# Patient Record
Sex: FEMALE | Race: ASIAN | NOT HISPANIC OR LATINO
[De-identification: names, ages, dates, MRNs, and addresses within clinical notes are randomized per-mention and may not be internally consistent; named-entity substitution may affect disease eponyms.]

---

## 2024-03-21 ENCOUNTER — APPOINTMENT (OUTPATIENT)
Dept: SURGERY | Facility: CLINIC | Age: 29
End: 2024-03-21
Payer: MEDICAID

## 2024-03-21 VITALS
WEIGHT: 135 LBS | DIASTOLIC BLOOD PRESSURE: 98 MMHG | SYSTOLIC BLOOD PRESSURE: 147 MMHG | HEART RATE: 74 BPM | BODY MASS INDEX: 26.5 KG/M2 | HEIGHT: 60 IN

## 2024-03-21 DIAGNOSIS — K59.00 CONSTIPATION, UNSPECIFIED: ICD-10-CM

## 2024-03-21 DIAGNOSIS — Z87.891 PERSONAL HISTORY OF NICOTINE DEPENDENCE: ICD-10-CM

## 2024-03-21 DIAGNOSIS — K64.9 UNSPECIFIED HEMORRHOIDS: ICD-10-CM

## 2024-03-21 DIAGNOSIS — K21.9 GASTRO-ESOPHAGEAL REFLUX DISEASE W/OUT ESOPHAGITIS: ICD-10-CM

## 2024-03-21 DIAGNOSIS — Z78.9 OTHER SPECIFIED HEALTH STATUS: ICD-10-CM

## 2024-03-21 DIAGNOSIS — R10.9 UNSPECIFIED ABDOMINAL PAIN: ICD-10-CM

## 2024-03-21 DIAGNOSIS — K62.89 OTHER SPECIFIED DISEASES OF ANUS AND RECTUM: ICD-10-CM

## 2024-03-21 PROCEDURE — 99203 OFFICE O/P NEW LOW 30 MIN: CPT

## 2024-03-21 NOTE — PHYSICAL EXAM
[Normal] : was normal [None] : there was no rectal abscess [No Rash or Lesion] : No rash or lesion [Alert] : alert [Oriented to Person] : oriented to person [Oriented to Place] : oriented to place [Oriented to Time] : oriented to time [Calm] : calm [de-identified] : A/Ox3; NAD. appears comfortable [de-identified] : EOMI; sclera anicteric. [de-identified] : airway patent, no use of accessory muscles [de-identified] : abd is soft, NT/ND [de-identified] : no cervical lymphadenopathy  [de-identified] : a lot of stool present ; external hemorrhoids

## 2024-03-21 NOTE — HISTORY OF PRESENT ILLNESS
[de-identified] : ASHANTI CISSE is a 28 year old F who is referred to the office for consultation visit, she presents w the cc of having intermittent rectal bleeding, c/w hemorrhoids.  She notices bleeding after a BM x 3-4 days, which has now stopped. Admits to some constipation, hard stool and straining.  No prior history of colonoscopy.  Has history of hemorrhoidectomy x 2, w painful recovery.   Has history of upper abdominal discomfort and excess acid reflux. On a PPI which she takes OTC as per PCP.  No previous imaging or abdominal US.

## 2024-03-21 NOTE — ASSESSMENT
[FreeTextEntry1] : IMP: 27 yo F w cc of having intermittent bleeding and rectal pain after BM's. History of hemorrhoidectomy x 2, w most recent 2 years ago @ [facility].

## 2024-03-21 NOTE — CONSULT LETTER
[Dear  ___] : Dear  [unfilled], [Consult Letter:] : I had the pleasure of evaluating your patient, [unfilled]. [Consult Closing:] : Thank you very much for allowing me to participate in the care of this patient.  If you have any questions, please do not hesitate to contact me. [Sincerely,] : Sincerely, [FreeTextEntry3] : Gab Cardoza MD

## 2024-03-21 NOTE — REVIEW OF SYSTEMS
[Constipation] : constipation [Negative] : Heme/Lymph [FreeTextEntry7] : rectal pain, intermittent bleeding after BMs

## 2024-03-21 NOTE — PLAN
[FreeTextEntry1] : recommended abdominal US, R/O gallstones  she wants to have external hemorrhoids removed;  recommended fleet enema and RTO for follow up visit for rigid sigmoidoscopy   Patient's questions and concerns addressed to their satisfaction, and patient verbalized an understanding of the information discussed.

## 2024-03-28 ENCOUNTER — APPOINTMENT (OUTPATIENT)
Dept: SURGERY | Facility: CLINIC | Age: 29
End: 2024-03-28
Payer: MEDICAID

## 2024-03-28 ENCOUNTER — APPOINTMENT (OUTPATIENT)
Dept: ULTRASOUND IMAGING | Facility: CLINIC | Age: 29
End: 2024-03-28
Payer: MEDICAID

## 2024-03-28 PROCEDURE — 76700 US EXAM ABDOM COMPLETE: CPT

## 2024-03-28 PROCEDURE — 45300 PROCTOSIGMOIDOSCOPY DX: CPT

## 2024-03-28 PROCEDURE — 99213 OFFICE O/P EST LOW 20 MIN: CPT | Mod: 25

## 2024-03-28 NOTE — PHYSICAL EXAM
[Normal] : was normal [None] : there was no rectal mass  [No Rash or Lesion] : No rash or lesion [Alert] : alert [Oriented to Person] : oriented to person [Oriented to Time] : oriented to time [Oriented to Place] : oriented to place [Calm] : calm [de-identified] : EOMI; sclera anicteric. [de-identified] : A/Ox3; NAD. appears comfortable [de-identified] : airway patent, no use of accessory muscles [de-identified] : no cervical lymphadenopathy  [de-identified] : external hemorrhoids  [de-identified] : abd is soft, NT/ND

## 2024-03-28 NOTE — REVIEW OF SYSTEMS
[Constipation] : constipation [Negative] : Endocrine [FreeTextEntry7] : rectal pain, intermittent bleeding after BMs

## 2024-03-28 NOTE — PLAN
[FreeTextEntry1] : patient not willing to pursue surgical management, stating that she had rubber band ligation multiple times over the course of year, w history of hemorrhoidectomy x 2.  recommended conservative management  stool softeners prn to avoid straining /constipation  RTO PRN  Patient's questions and concerns addressed to their satisfaction, and patient verbalized an understanding of the information discussed.

## 2024-03-28 NOTE — ASSESSMENT
[FreeTextEntry1] : IMP: 29 yo F w cc of having intermittent bleeding and rectal pain after BM's. History of hemorrhoidectomy x 2, w most recent 2 years ago @ [facility].   Rigid sigmoidoscopy Patient was placed in left lateral position. After a digital rectal exam, the sigmoidoscope was inserted gently. Scope was passed to 22 cm.  Findings: no masses or polyp seen. no bleeding; internal hemorrhoids ; hypertrophic anal papillae

## 2024-03-28 NOTE — HISTORY OF PRESENT ILLNESS
[de-identified] : ASHANTI CISSE is a 28 year old F here for follow up. Patient presents w the cc of having intermittent rectal bleeding, c/w hemorrhoids.  She notices bleeding after a BM x 3-4 days, which has now stopped. Admits to some constipation, hard stool and straining.  No prior history of colonoscopy.  Has history of hemorrhoidectomy x 2, w painful recovery.   Has history of upper abdominal discomfort and excess acid reflux. On a PPI which she takes OTC as per PCP.  No previous imaging or abdominal US.   Patient had gone for abdominal US, earlier today.  She had used a fleet enema and presents for rigid sigmoidoscopy today. Patient had noticed some bleeding after BM, again, today.